# Patient Record
Sex: FEMALE | Race: WHITE | Employment: FULL TIME | URBAN - METROPOLITAN AREA
[De-identification: names, ages, dates, MRNs, and addresses within clinical notes are randomized per-mention and may not be internally consistent; named-entity substitution may affect disease eponyms.]

---

## 2017-10-23 ENCOUNTER — OFFICE VISIT (OUTPATIENT)
Dept: INTERNAL MEDICINE CLINIC | Age: 32
End: 2017-10-23

## 2017-10-23 VITALS
RESPIRATION RATE: 17 BRPM | SYSTOLIC BLOOD PRESSURE: 124 MMHG | HEIGHT: 63 IN | WEIGHT: 149.4 LBS | BODY MASS INDEX: 26.47 KG/M2 | OXYGEN SATURATION: 98 % | TEMPERATURE: 98.9 F | DIASTOLIC BLOOD PRESSURE: 86 MMHG | HEART RATE: 86 BPM

## 2017-10-23 DIAGNOSIS — E78.00 ELEVATED LDL CHOLESTEROL LEVEL: ICD-10-CM

## 2017-10-23 DIAGNOSIS — H66.003 ACUTE SUPPURATIVE OTITIS MEDIA OF BOTH EARS WITHOUT SPONTANEOUS RUPTURE OF TYMPANIC MEMBRANES, RECURRENCE NOT SPECIFIED: Primary | ICD-10-CM

## 2017-10-23 DIAGNOSIS — R03.0 ELEVATED BLOOD PRESSURE READING: ICD-10-CM

## 2017-10-23 RX ORDER — ASCORBIC ACID, BIOTIN, CHOLECALCIFEROL, CYANOCOBALAMIN, FOLIC ACID, FERROUS ASPARTO GLYCINATE, POTASSIUM IODIDE, PYRIDOXINE HYDROCHLORIDE, .ALPHA.-TOCOPHEROL ACETATE, DL-, DOCUSATE SODIUM AND BLUEBERRY 30; 75; 500; 13; 400; 10; 150; 5; 10; 200; 5; 600 MG/1; UG/1; [IU]/1; UG/1; UG/1; MG/1; UG/1; MG/1; [IU]/1; MG/1; MG/1; UG/1
TABLET, FILM COATED ORAL
COMMUNITY
Start: 2017-10-04

## 2017-10-23 RX ORDER — LEVOTHYROXINE SODIUM 50 UG/1
TABLET ORAL
COMMUNITY
Start: 2017-10-12

## 2017-10-23 RX ORDER — AMOXICILLIN 875 MG/1
875 TABLET, FILM COATED ORAL 2 TIMES DAILY
Qty: 20 TAB | Refills: 0 | Status: SHIPPED | OUTPATIENT
Start: 2017-10-23 | End: 2018-07-10 | Stop reason: ALTCHOICE

## 2017-10-23 NOTE — PROGRESS NOTES
Pt here for high blood pressure that has been occurring for a few days along with dizzy spells. She also states on the 14th her smart watch alerted her that while she was sleeping her heart rate had increased to 127   Chief Complaint   Patient presents with    Hypertension     Visit Vitals    /86 (BP 1 Location: Right arm, BP Patient Position: Sitting)    Pulse 86    Temp 98.9 °F (37.2 °C) (Oral)    Resp 17    Ht 5' 3\" (1.6 m)    Wt 149 lb 6.4 oz (67.8 kg)    SpO2 98%    BMI 26.47 kg/m2     1. Have you been to the ER, urgent care clinic since your last visit? Hospitalized since your last visit? No    2. Have you seen or consulted any other health care providers outside of the [address] [place] [address] since your last visit? Include any pap smears or colon screening.  Yes 10/12/17 endocrinologist Rehana Reyes

## 2017-10-23 NOTE — PROGRESS NOTES
Mykel Ricks is a  28 y.o. female presents for visit. Concerned about elevated blood pressure    Chief Complaint   Patient presents with    Hypertension       HPI     Patient reports dx with gestational HTN at the end of her pregnancy in August 2017. Post-partum BP returned to baseline. Recently at home she has had a couple of elevated BP readings,  332'Y systolic and 442 diastolic and 1 episode of tachycardia that was noted by her smart watch. Also reports 2 episodes of light headedness and feeling dizzy which resolved spontaneously after 30 seconds. Reports family history of heart disease. Review of Systems   Constitutional: Negative for chills and fever. Respiratory: Negative for shortness of breath. Cardiovascular: Negative for chest pain. Gastrointestinal: Negative for nausea and vomiting. Genitourinary: Negative for urgency. Neurological: Positive for dizziness. Negative for sensory change. Patient Active Problem List    Diagnosis Date Noted    Endometriosis 12/22/2011    Vitamin D deficiency 02/19/2010    Unspecified asthma(493.90) 05/04/2009    Allergic rhinitis, cause unspecified 05/04/2009    Migraine 05/04/2009     Past Medical History:   Diagnosis Date    Asthma     Ovarian cyst       Past Surgical History:   Procedure Laterality Date    ABDOMEN SURGERY PROC UNLISTED      3 abdominal     HX ORTHOPAEDIC      right knee        Social History   Substance Use Topics    Smoking status: Never Smoker    Smokeless tobacco: Never Used    Alcohol use Yes      Comment: rare      Social History     Social History Narrative     Family History   Problem Relation Age of Onset   N[facility] Stamp Migraines Mother     Diabetes Father     Heart Failure Father     Asthma Father       Prior to Admission medications    Medication Sig Start Date End Date Taking?  Authorizing Provider   UNITHROID 50 mcg tablet  10/12/17  Yes Historical Provider   PRENATE PIXIE 10 mg iron- 1 mg-200 mg cap  10/4/17  Yes Historical Provider   amoxicillin (AMOXIL) 875 mg tablet Take 1 Tab by mouth two (2) times a day. 10/23/17  Yes Amy Hanson NP   [facility]rate [facility] m[facility]/actuation inhal[facility] Take  by inhalation. Yes Historical Provider   fluticasone-salmeterol (ADVAIR) 250-50 mcg/dose diskus inhaler Take 1 Puff by inhalation two (2) times a day. 8/18/14  Yes Susan Larios MD   cetirizine (ZYRTEC) 10 mg tablet take 1 Tab by mouth daily. 6/24/09  Yes Barbara Askew MD   montelukast (SINGULAIR) 10 mg tablet Take 10 mg by mouth daily. Historical Provider   rizatriptan (MAXALT) 10 mg tablet Take 1 tablet by mouth once as needed for Migraine for up to 10 doses. May repeat in 2 hours if needed 11/10/14   Susan Larios MD      Allergies   Allergen Reactions    Darvocet A500 [Propoxyphene N-Acetaminophen] Other (comments)     Causes severe vomiting    Magnesium Oxide Myalgia    Percocet [Oxycodone-Acetaminophen] Other (comments)     Causes severe vomiting          Visit Vitals    /86 (BP 1 Location: Right arm, BP Patient Position: Sitting)    Pulse 86    Temp 98.9 °F (37.2 °C) (Oral)    Resp 17    Ht 5' 3\" (1.6 m)    Wt 149 lb 6.4 oz (67.8 kg)    SpO2 98%    BMI 26.47 kg/m2     Physical Exam   Constitutional: No distress. HENT:   Head: Normocephalic and atraumatic. Right Ear: No tenderness. Tympanic membrane is erythematous. No middle ear effusion. Left Ear: No tenderness. Tympanic membrane is erythematous. No middle ear effusion. Nose: No mucosal edema or rhinorrhea. Right sinus exhibits no maxillary sinus tenderness and no frontal sinus tenderness. Left sinus exhibits no maxillary sinus tenderness and no frontal sinus tenderness. Mouth/Throat: Uvula is midline and mucous membranes are normal. No oropharyngeal exudate or posterior oropharyngeal erythema. Eyes: Conjunctivae are normal.   Cardiovascular: Regular rhythm and normal heart sounds.     No murmur heard.  Pulmonary/Chest: Effort normal and breath sounds normal. She has no wheezes. She has no rales. Lymphadenopathy:     She has no cervical adenopathy. Nursing note and vitals reviewed. This note will not be viewable in [address] ASSESSMENT AND PLAN:      ICD-10-CM ICD-9-CM    1. Acute suppurative otitis media of both ears without spontaneous rupture of tympanic membranes, recurrence not specified H66.003 382.00 amoxicillin (AMOXIL) 875 mg tablet   2. Elevated LDL cholesterol level E78.00 272.0 Encourage eating healthy, active lifestyle,   3. Elevated blood pressure reading R03.0 796.2 Returned to baseline, continue to monitor         Follow-up Disposition:  Return in about 4 weeks (around 11/20/2017), or if symptoms worsen or fail to improve, for FULL Phyisal Exam.        Disclaimer:  Advised her to call back or return to office if symptoms worsen/change/persist.  Discussed expected course/resolution/complications of diagnosis in detail with patient. Medication risks/benefits/alternatives discussed with patient. She was given an after visit summary which includes diagnoses, current medications, & vitals. Discussed patient instructions and advised to read to all patient instructions regarding care. She expressed understanding with the diagnosis and plan.

## 2018-07-10 ENCOUNTER — OFFICE VISIT (OUTPATIENT)
Dept: INTERNAL MEDICINE CLINIC | Age: 33
End: 2018-07-10

## 2018-07-10 VITALS
RESPIRATION RATE: 12 BRPM | WEIGHT: 160 LBS | BODY MASS INDEX: 28.35 KG/M2 | OXYGEN SATURATION: 98 % | HEIGHT: 63 IN | TEMPERATURE: 98.4 F | DIASTOLIC BLOOD PRESSURE: 84 MMHG | SYSTOLIC BLOOD PRESSURE: 112 MMHG | HEART RATE: 90 BPM

## 2018-07-10 DIAGNOSIS — J01.00 SUBACUTE MAXILLARY SINUSITIS: ICD-10-CM

## 2018-07-10 DIAGNOSIS — H66.91 ACUTE INFECTION OF RIGHT EAR: Primary | ICD-10-CM

## 2018-07-10 RX ORDER — LEVOTHYROXINE SODIUM 125 UG/1
TABLET ORAL
COMMUNITY
Start: 2018-05-30

## 2018-07-10 RX ORDER — AMOXICILLIN AND CLAVULANATE POTASSIUM 875; 125 MG/1; MG/1
1 TABLET, FILM COATED ORAL EVERY 12 HOURS
Qty: 14 TAB | Refills: 0 | Status: SHIPPED | OUTPATIENT
Start: 2018-07-10 | End: 2018-07-17

## 2018-07-10 RX ORDER — FLUTICASONE PROPIONATE AND SALMETEROL 50; 500 UG/1; UG/1
POWDER RESPIRATORY (INHALATION)
COMMUNITY
Start: 2018-06-19

## 2018-07-10 RX ORDER — NORETHINDRONE ACETATE AND ETHINYL ESTRADIOL 1.5; 3 MG/1; UG/1
TABLET ORAL
COMMUNITY
Start: 2018-06-25

## 2018-07-10 RX ORDER — FLUTICASONE PROPIONATE 50 MCG
2 SPRAY, SUSPENSION (ML) NASAL DAILY
Qty: 1 BOTTLE | Refills: 0 | Status: SHIPPED | OUTPATIENT
Start: 2018-07-10 | End: 2018-07-15

## 2018-07-10 NOTE — PROGRESS NOTES
Written by Savannah Fernando, as dictated by Dr. Tang Shelton MD.    Cecile Duobse is a 28 y.o. female. HPI  The patient presents today c/o sinus infection and possible ear infection for 2 nights. She reports that her son came home sick from his father's house. Her son has a cold and ear infection. She reports that the temporal and oral thermometer had normal temperature readings. Her tympanic temperature was 102. She has only been taking Mucinex. She has not had any issues taking Augmentin. She has a hx of sinus infections. She is compliant on Unithroid 125 mcg. She just stopped taking prenatal vitamins last week. Her son is 11 months. Patient Active Problem List   Diagnosis Code    Unspecified asthma(493.90) J45.909    Allergic rhinitis, cause unspecified J30.9    Migraine G43.909    Endometriosis N80.9        Current Outpatient Prescriptions on File Prior to Visit   Medication Sig Dispense Refill    levalbuterol tartrate (XOPENEX HFA) 45 mcg/actuation inhaler Take  by inhalation.  cetirizine (ZYRTEC) 10 mg tablet take 1 Tab by mouth daily. 90 Tab 4    UNITHROID 50 mcg tablet       PRENATE PIXIE 10 mg iron- 1 mg-200 mg cap       montelukast (SINGULAIR) 10 mg tablet Take 10 mg by mouth daily.  rizatriptan (MAXALT) 10 mg tablet Take 1 tablet by mouth once as needed for Migraine for up to 10 doses. May repeat in 2 hours if needed 10 tablet 0    fluticasone-salmeterol (ADVAIR) 250-50 mcg/dose diskus inhaler Take 1 Puff by inhalation two (2) times a day. 1 Inhaler 1     No current facility-administered medications on file prior to visit.         Allergies   Allergen Reactions    Darvocet A500 [Propoxyphene N-Acetaminophen] Other (comments)     Causes severe vomiting    Magnesium Oxide Myalgia    Percocet [Oxycodone-Acetaminophen] Other (comments)     Causes severe vomiting       Past Medical History:   Diagnosis Date    Asthma     Ovarian cyst Past Surgical History:   Procedure Laterality Date    ABDOMEN SURGERY PROC UNLISTED      3 abdominal     HX ORTHOPAEDIC      right knee       Family History   Problem Relation Age of Onset   [address] [facility] Migra[facility] Mother     Diabetes Father     Heart Failure Father     Asthma Father        Social History     Social History    Marital status: SINGLE     Spouse name: N/A    Number of children: N/A    Years of education: N/A     Occupational History    Not on file. Social History Main Topics    Smoking status: Never Smoker    Smokeless tobacco: Never Used    Alcohol use Yes      Comment: rare    Drug use: No    Sexual activity: Yes     Partners: Male     Birth control/ protection: Pill     Other Topics Concern    Not on file     Social History Narrative       Review of Systems   Constitutional: Positive for malaise/fatigue. HENT: Positive for congestion, ear pain and sinus pain. Eyes: Negative for blurred vision and pain. Respiratory: Negative for cough and shortness of breath. Cardiovascular: Negative for chest pain and palpitations. Neurological: Negative for weakness. Psychiatric/Behavioral: Negative for depression and memory loss. Visit Vitals    /84 (BP 1 Location: Right arm, BP Patient Position: Sitting)    Pulse 90    Temp 98.4 °F (36.9 °C) (Oral)    Resp 12    Ht 5' 3\" (1.6 m)    Wt 160 lb (72.6 kg)    LMP 06/25/2018    SpO2 98%    BMI 28.34 kg/m2       Physical Exam   Constitutional: She is oriented to person, place, and time. She appears well-developed and well-nourished. HENT:   Right Ear: External ear normal.   Left Ear: External ear normal.   BL ear canal erythema R more than L, BL maxillary tenderness L more than R   Eyes: Conjunctivae and EOM are normal. Right eye exhibits no discharge. Left eye exhibits no discharge. Neck: Normal range of motion. Neck supple. Cardiovascular: Normal rate and regular rhythm.     Pulmonary/Chest: Effort normal and breath sounds normal.   Abdominal: Soft. Bowel sounds are normal.   Lymphadenopathy:     She has no cervical adenopathy. Neurological: She is alert and oriented to person, place, and time. Psychiatric: She has a normal mood and affect. Nursing note and vitals reviewed. ASSESSMENT and PLAN    ICD-10-CM ICD-9-CM    1. Acute infection of right ear H66.91 382.9 amoxicillin-clavulanate (AUGMENTIN) 875-125 mg per tablet sent to pharmacy. 2. Subacute maxillary sinusitis J01.00 461.0 amoxicillin-clavulanate (AUGMENTIN) 875-125 mg per tablet sent to pharmacy. fluticasone (FLONASE) 50 mcg/actuation nasal spray sent to pharmacy. Augmentin 875-125 mg prescribed. She should take 1 tablet BID for 7 days. While taking Augmentin, she should take probiotics to help prevent nausea. Flonase prescribed. This plan was reviewed with the patient and patient agrees. All questions were answered. This scribe documentation was reviewed by me and accurately reflects the examination and decisions made by me. This note will not be viewable in [address]

## 2018-07-10 NOTE — PROGRESS NOTES
1. Have you been to the ER, urgent care clinic since your last visit? Hospitalized since your last visit? No    2. Have you seen or consulted any other health care providers outside of the [address] [place] [address] since your last visit? Include any pap smears or colon screening. No       Patient had pap smear 11/2017-GYN- [address] [facility]      Chief Complaint   Patient presents with    Sinus Infection     Patient states it started 2 days ago, having pressure and drainage. States she is having pain in both ears. Patient states she took her tympanic temperature and it was 102 but when she took it oral it was 98.       Visit Vitals    /84 (BP 1 Location: Right arm, BP Patient Position: Sitting)    Pulse 90    Temp 98.4 °F (36.9 °C) (Oral)    Resp 12    Ht 5' 3\" (1.6 m)    Wt 160 lb (72.6 kg)    SpO2 98%    BMI 28.34 kg/m2       PHQ over the last two weeks 7/10/2018   Little interest or pleasure in doing things Not at all   Feeling down, depressed or hopeless Not at all   Total Score PHQ 2 0     Learning Assessment 7/10/2018   PRIMARY LEARNER Patient   PRIMARY LANGUAGE ENGLISH   LEARNER PREFERENCE PRIMARY DEMONSTRATION   ANSWERED BY patient   RELATIONSHIP SELF

## 2018-07-10 NOTE — MR AVS SNAPSHOT
[address] [facility] [address] [place] [address] [address] 
[phone] Patient: Tushar Nguyen MRN: [id number] Visit Information Date & Time Provider Department Dept. Phone Encounter #  
 7/10/2018  9:30 AM Roger Snell MD [facility] Internal Medicine [phone] [id number] Upcoming Health Maintenance Date Due  
 PAP AKA CERVICAL CYTOLOGY 10/15/2017 Influenza Age 5 to Adult 8/1/2018 DTaP/Tdap/Td series (2 - Td) 9/8/2025 Allergies as of 7/10/2018  Review Complete On: 7/10/2018 By: Roger Snell MD  
  
 Severity Noted Reaction Type Reactions Darvocet A500 [Propoxyphene N-acetaminophen]  11/30/2010    Other (comments) Causes severe vomiting Magnesium Oxide  09/08/2015    Myalgia Percocet [Oxycodone-acetaminophen]  11/30/2010    Other (comments) Causes severe vomiting Current Immunizations  Never Reviewed Name Date Tdap 9/8/2015 Not reviewed this visit You Were Diagnosed With   
  
 Codes Comments Acute infection of right ear    -  Primary ICD-10-CM: H66.91 
ICD-9-CM: 382.9 Subacute maxillary sinusitis     ICD-10-CM: J01.00 ICD-9-CM: 461.0 Vitals BP Pulse Temp Resp Height(growth percentile) Weight(growth percentile) 112/84 (BP 1 Location: Right arm, BP Patient Position: Sitting) 90 98.4 °F (36.9 °C) (Oral) 12 5' 3\" (1.6 m) 160 lb (72.6 kg) LMP SpO2 BMI OB Status Smoking Status 06/25/2018 98% 28.34 kg/m2 Having regular periods Never Smoker Vitals History BMI and BSA Data Body Mass Index Body Surface Area  
 28.34 kg/m 2 1.8 m 2 Preferred Pharmacy Pharmacy Name Phone [facility], [address] [place] [address] [phone] Your Updated Medication List  
  
   
This list is accurate as of 7/10/18 10:18 AM.  Always use your most recent med list.  
  
  
  
  
 amoxicillin-clavulanate 875-125 mg per tablet Commonly known as:  AUGMENTIN Take 1 Tab by mouth every twelve (12) hours for 7 days. cetirizine 10 mg tablet Commonly known as:  ZyrTEC  
take 1 Tab by mouth daily. fluticasone 50 mcg/actuation nasal spray Commonly known as:  Janette Fryer 2 Sprays by Both Nostrils route daily for 5 days. * fluticasone-salmeterol 250-50 mcg/dose diskus inhaler Commonly known as:  ADVAIR Take 1 Puff by inhalation two (2) times a day. * ADVAIR DISKUS 500-50 mcg/dose diskus inhaler Generic drug:  fluticasone-salmeterol Irma Cristian 1.5/30 (21) 1.5-30 mg-mcg Tab Generic drug:  norethindrone ac-eth estradiol  
  
 montelukast 10 mg tablet Commonly known as:  SINGULAIR Take 10 mg by mouth daily. PRENATE PIXIE 10 mg iron- 1 mg-200 mg Cap Generic drug:  prenatal vit 85-iron-FA 1-dha  
  
 rizatriptan 10 mg tablet Commonly known as:  Epimenio Ormond Take 1 tablet by mouth once as needed for Migraine for up to 10 doses. May repeat in 2 hours if needed * UNITHROID 50 mcg tablet Generic drug:  levothyroxine * UNITHROID 125 mcg tablet Generic drug:  levothyroxine XOPENEX HFA 45 mcg/actuation inhaler Generic drug:  levalbuterol tartrate Take  by inhalation. * Notice: This list has 4 medication(s) that are the same as other medications prescribed for you. Read the directions carefully, and ask your doctor or other care provider to review them with you. Prescriptions Sent to Pharmacy Refills  
 amoxicillin-clavulanate (AUGMENTIN) 875-125 mg per tablet 0 Sig: Take 1 Tab by mouth every twelve (12) hours for 7 days. Class: Normal  
 Pharmacy: [facility] [address] Ph #: [phone] Route: Oral  
 fluticasone (FLONASE) 50 mcg/actuation nasal spray 0 Si Sprays by Both Nostrils route daily for 5 days. Class: Normal  
 Pharmacy: [facility] [address] Ph #: [phone] Route: Both Nostrils Introducing [facility]! [employer] introduces [employer] patient portal. Now you can access parts of your medical record, email your doctor's office, and request medication refills online. 1. In your internet browser, go to [email or web address] 2. Click on the First Time User? Click Here link in the Sign In box. You will see the New Member Sign Up page. 3. Enter your [employer] Access Code exactly as it appears below. You will not need to use this code after youve completed the sign-up process. If you do not sign up before the expiration date, you must request a new code. · [employer] Access Code: [email or web address] Expires: 10/8/2018 10:18 AM 
 
4. Enter the last four digits of your Social Security Number (xxxx) and Date of Birth (mm/dd/yyyy) as indicated and click Submit. You will be taken to the next sign-up page. 5. Create a [employer] ID. This will be your [employer] login ID and cannot be changed, so think of one that is secure and easy to remember. 6. Create a [employer] password. You can change your password at any time. 7. Enter your Password Reset Question and Answer. This can be used at a later time if you forget your password. 8. Enter your e-mail address. You will receive e-mail notification when new information is available in [address] 9. Click Sign Up. You can now view and download portions of your medical record. 10. Click the Download Summary menu link to download a portable copy of your medical information. If you have questions, please visit the Frequently Asked Questions section of the [employer] website. Remember, [employer] is NOT to be used for urgent needs. For medical emergencies, dial 911. Now available from your iPhone and Android! Please provide this summary of care documentation to your next provider. Your primary care clinician is listed as Belkis Machado.  If you have any questions after today's visit, please call [phone].

## 2019-03-28 ENCOUNTER — OFFICE VISIT (OUTPATIENT)
Dept: PRIMARY CARE CLINIC | Age: 34
End: 2019-03-28

## 2019-03-28 VITALS
SYSTOLIC BLOOD PRESSURE: 121 MMHG | TEMPERATURE: 98.5 F | HEIGHT: 63 IN | OXYGEN SATURATION: 96 % | HEART RATE: 85 BPM | BODY MASS INDEX: 28.88 KG/M2 | WEIGHT: 163 LBS | DIASTOLIC BLOOD PRESSURE: 81 MMHG | RESPIRATION RATE: 16 BRPM

## 2019-03-28 DIAGNOSIS — B35.1 ONYCHOMYCOSIS OF TOENAIL: ICD-10-CM

## 2019-03-28 DIAGNOSIS — J02.9 SORE THROAT: Primary | ICD-10-CM

## 2019-03-28 DIAGNOSIS — K20.90 ESOPHAGITIS: ICD-10-CM

## 2019-03-28 LAB
S PYO AG THROAT QL: NEGATIVE
VALID INTERNAL CONTROL?: YES

## 2019-03-28 RX ORDER — CICLOPIROX 80 MG/ML
SOLUTION TOPICAL
Qty: 6.6 ML | Refills: 1 | Status: SHIPPED | OUTPATIENT
Start: 2019-03-28

## 2019-03-28 RX ORDER — SUCRALFATE 1 G/10ML
1 SUSPENSION ORAL 4 TIMES DAILY
Qty: 414 ML | Refills: 0 | Status: SHIPPED | OUTPATIENT
Start: 2019-03-28

## 2019-03-28 NOTE — PROGRESS NOTES
Chief Complaint   Patient presents with    Other     basically patient states that she had sinus infection and had to cut her medication in half and felt like it got stuck in the throat so one little section of the throat feels like it was cut or something by the pill.   also feels like she has a toe nail infection from a broken toenail.  great toe on the left foot

## 2021-01-26 ENCOUNTER — OFFICE VISIT (OUTPATIENT)
Dept: PRIMARY CARE CLINIC | Age: 36
End: 2021-01-26
Payer: COMMERCIAL

## 2021-01-26 VITALS
RESPIRATION RATE: 18 BRPM | DIASTOLIC BLOOD PRESSURE: 82 MMHG | TEMPERATURE: 98.6 F | HEART RATE: 84 BPM | OXYGEN SATURATION: 100 % | BODY MASS INDEX: 26.15 KG/M2 | HEIGHT: 63 IN | WEIGHT: 147.6 LBS | SYSTOLIC BLOOD PRESSURE: 115 MMHG

## 2021-01-26 DIAGNOSIS — B35.1 ONYCHOMYCOSIS OF TOENAIL: ICD-10-CM

## 2021-01-26 DIAGNOSIS — G43.111 INTRACTABLE MIGRAINE WITH AURA WITH STATUS MIGRAINOSUS: ICD-10-CM

## 2021-01-26 DIAGNOSIS — J45.40 MODERATE PERSISTENT ASTHMA WITHOUT COMPLICATION: ICD-10-CM

## 2021-01-26 DIAGNOSIS — E55.9 VITAMIN D DEFICIENCY: ICD-10-CM

## 2021-01-26 DIAGNOSIS — Z00.00 PHYSICAL EXAM: Primary | ICD-10-CM

## 2021-01-26 PROCEDURE — 99395 PREV VISIT EST AGE 18-39: CPT | Performed by: INTERNAL MEDICINE

## 2021-01-26 RX ORDER — TERBINAFINE HYDROCHLORIDE 250 MG/1
250 TABLET ORAL DAILY
Qty: 90 TAB | Refills: 0 | Status: SHIPPED | OUTPATIENT
Start: 2021-01-26 | End: 2021-04-26

## 2021-01-26 NOTE — PROGRESS NOTES
Written by Hema Han, as dictated by Dr. Patrick Siddiqui MD.    Amelia Cuba (: 1985) is a 28 y.o. female, established patient, here for evaluation of the following chief complaint(s):  Physical (labs) and Nail Problem (non healing)     SUBJECTIVE/OBJECTIVE:  HPI  The patient comes in fasting today for a complete physical examination and labs. She has not been taking vitamin D recently but plans to start. She had her son 3 and a half years ago and has not been having migraine issues until the past few months. Recently stress has been triggering more frequent migraines. Usually she sleeps to get rid of them, but if she has to stay at work she takes tylenol. She broke her toenail when she pulled a door back too hard, and it has not be healing correctly. She came here on 19 and NP Jackeline Lozano prescribed ciclopirox 8% solution first given the risk of PO antifungal medications. She has tried applying this every day and is still having issues with the toenail. She is having trouble from time to time with her insurance covering her Advair inhaler and has had to wait for a few weeks to get the inhaler. However, she has it now and also continues on Singulair. She got her Pap smear done in 2020 and her endometriosis is stable. She endorses dysmenorrhea, but this is normal for her. Patient Active Problem List   Diagnosis Code    Unspecified asthma(493.90) J45.909    Allergic rhinitis, cause unspecified J30.9    Migraine G43.909    Endometriosis N80.9        Current Outpatient Medications on File Prior to Visit   Medication Sig Dispense Refill    sucralfate (CARAFATE) 100 mg/mL suspension Take 5 mL by mouth four (4) times daily. 414 mL 0    ADVAIR DISKUS 500-50 mcg/dose diskus inhaler       UNITHROID 125 mcg tablet       PRENATE PIXIE 10 mg iron- 1 mg-200 mg cap       montelukast (SINGULAIR) 10 mg tablet Take 10 mg by mouth daily.       levalbuterol tartrate [facility] HFA) 45 mcg/actuation inhaler Take  by inhalation.  rizatriptan (MAXALT) 10 mg tablet Take 1 tablet by mouth once as needed for Migraine for up to 10 doses. May repeat in 2 hours if needed 10 tablet 0    fluticasone-salmeterol (ADVAIR) 250-50 mcg/dose diskus inhaler Take 1 Puff by inhalation two (2) times a day. 1 Inhaler 1    cetirizine (ZYRTEC) 10 mg tablet take 1 Tab by mouth daily. 90 Tab 4    ciclopirox (PENLAC) 8 % solution Apply thin layer to toe nightly. 6.6 mL 1    JUNEL 1.5/30, 21, 1.5-30 mg-mcg tab       UNITHROID 50 mcg tablet        No current facility-administered medications on file prior to visit.         Allergies   Allergen Reactions    Darvocet A500 [Propoxyphene N-Acetaminophen] Other (comments)     Causes severe vomiting    Hydrocodone Nausea and Vomiting     Severe vomiting      Magnesium Oxide Myalgia    Percocet [Oxycodone-Acetaminophen] Other (comments)     Causes severe vomiting       Past Medical History:   Diagnosis Date    Asthma     Ovarian cyst        Past Surgical History:   Procedure Laterality Date    HX ORTHOPAEDIC      right knee    NV ABDOMEN SURGERY PROC UNLISTED      3 abdominal        Family History   Problem Relation Age of Onset    Migraines Mother     Diabetes Father     Heart Failure Father     Asthma Father        Social History     Socioeconomic History    Marital status: SINGLE     Spouse name: Not on file    Number of children: Not on file    Years of education: Not on file    Highest education level: Not on file   Occupational History    Not on file   Social Needs    Financial resource strain: Not on file    Food insecurity     Worry: Not on file     Inability: Not on file    Transportation needs     Medical: Not on file     Non-medical: Not on file   Tobacco Use    Smoking status: Never Smoker    Smokeless tobacco: Never Used   Substance and Sexual Activity    Alcohol use: Yes     Comment: rare    Drug use: No    Sexual activity: Yes Partners: Male     Birth control/protection: Pill   Lifestyle    Physical activity     Days per week: Not on file     Minutes per session: Not on file    Stress: Not on file   Relationships    Social connections     Talks on phone: Not on file     Gets together: Not on file     Attends [employer] service: Not on file     Active member of club or organization: Not on file     Attends meetings of clubs or organizations: Not on file     Relationship status: Not on file    Intimate partner violence     Fear of current or ex partner: Not on file     Emotionally abused: Not on file     Physically abused: Not on file     Forced sexual activity: Not on file   Other Topics Concern    Not on file   Social History Narrative    Not on file       No visits with results within 3 Month(s) from this visit. Latest known visit with results is:   Office Visit on 03/28/2019   Component Date Value Ref Range Status    VALID INTERNAL CONTROL POC 03/28/2019 Yes   Final    Group A Strep Ag 03/28/2019 Negative  Negative Final     Review of Systems   Constitutional: Negative for activity change, fatigue and unexpected weight change. HENT: Negative for congestion, hearing loss, rhinorrhea and sore throat. Eyes: Negative for discharge. Respiratory: Negative for cough, chest tightness and shortness of breath. Cardiovascular: Negative for leg swelling. Gastrointestinal: Negative for abdominal pain, constipation and diarrhea. Genitourinary: Negative for dysuria, flank pain, frequency and urgency. Musculoskeletal: Negative for arthralgias, back pain and myalgias. Skin: Negative for color change and rash. +broken toenail   Neurological: Negative for dizziness, light-headedness and headaches. Psychiatric/Behavioral: Negative for dysphoric mood and sleep disturbance. The patient is not nervous/anxious.       Visit Vitals  /82 (BP 1 Location: Right arm, BP Patient Position: Sitting)   Pulse 84   Temp 98.6 °F (37 °C) (Temporal)   Resp 18   Ht 5' 3\" (1.6 m)   Wt 147 lb 9.6 oz (67 kg)   LMP 01/24/2021   SpO2 100%   BMI 26.15 kg/m²     Physical Exam  Vitals signs and nursing note reviewed. Constitutional:       General: She is not in acute distress. Appearance: Normal appearance. She is normal weight. She is not diaphoretic. HENT:      Right Ear: Tympanic membrane, ear canal and external ear normal.      Left Ear: Tympanic membrane, ear canal and external ear normal.      Mouth/Throat:      Mouth: Mucous membranes are moist.      Pharynx: Oropharynx is clear. Eyes:      General:         Right eye: No discharge. Left eye: No discharge. Extraocular Movements: Extraocular movements intact. Conjunctiva/sclera: Conjunctivae normal.   Neck:      Thyroid: No thyromegaly. Cardiovascular:      Rate and Rhythm: Normal rate and regular rhythm. Pulses: Normal pulses. Dorsalis pedis pulses are 2+ on the right side and 2+ on the left side. Pulmonary:      Effort: Pulmonary effort is normal.      Breath sounds: Normal breath sounds. No wheezing. Abdominal:      General: Bowel sounds are normal. There is no distension. Palpations: Abdomen is soft. Tenderness: There is no abdominal tenderness. Musculoskeletal:      Right lower leg: No edema. Left lower leg: No edema. Comments: B/L knees without crepitus   Feet:      Left foot:      Toenail Condition: Left toenails are abnormally thick. Fungal disease present. Lymphadenopathy:      Cervical: No cervical adenopathy. Neurological:      Deep Tendon Reflexes:      Reflex Scores:       Patellar reflexes are 2+ on the right side and 2+ on the left side. Psychiatric:         Mood and Affect: Mood and affect normal.         ASSESSMENT/PLAN:  1. Physical exam  -     METABOLIC PANEL, COMPREHENSIVE; Future  -     CBC W/O DIFF; Future  -     LIPID PANEL; Future  -     TSH 3RD GENERATION; Future  Complete physical exam done. Basic labs drawn. 2. Intractable migraine with aura with status migrainosus  Takes tylenol as needed for pain. If continues will discuss Imitrex. 3. Onychomycosis of toenail  -     terbinafine HCL (LAMISIL) 250 mg tablet; Take 1 Tab by mouth daily for 90 days. , Normal, Disp-90 Tab, R-0 sent to pharmacy. Potential side effects were discussed. I prescribed Lamisil but explained that she will need to have labs done to make sure her liver is healthy to metabolize this medication. After a month we will need to re-check her CMP to make sure her liver remains healthy. The medication will help with her discoloration, but she may need to see a podiatrist to completely fix the toenail thickness. 4. Vitamin D deficiency  -     VITAMIN D, 25 HYDROXY; Future  Check vitamin D. She has not been taking a vitamin D supplement. 5. Moderate persistent asthma without complication  Stable, continues on Advair BID and Singulair every day. This plan was reviewed with the patient and patient agrees. All questions were answered. This scribe documentation was reviewed by me and accurately reflects the examination and decisions made by me. An electronic signature was used to authenticate this note.   -- Laina Jones

## 2021-04-09 ENCOUNTER — TELEPHONE (OUTPATIENT)
Dept: PRIMARY CARE CLINIC | Age: 36
End: 2021-04-09

## 2021-04-09 DIAGNOSIS — R94.4 KIDNEY FUNCTION STUDY ABNORMALITY: Primary | ICD-10-CM

## 2021-04-09 NOTE — TELEPHONE ENCOUNTER
Spoke with patient, she stated that Dr Ulises Schroeder wanted to check her Kidney and liver function after start of new medication

## 2021-04-09 NOTE — TELEPHONE ENCOUNTER
Patient called stating that she is needing lab work done in order to take a prescription that Dr. Kentrell Varela called in for her. Patient is requesting the order to be put in.

## 2021-07-28 ENCOUNTER — VIRTUAL VISIT (OUTPATIENT)
Dept: PRIMARY CARE CLINIC | Age: 36
End: 2021-07-28
Payer: COMMERCIAL

## 2021-07-28 DIAGNOSIS — J01.00 SUBACUTE MAXILLARY SINUSITIS: ICD-10-CM

## 2021-07-28 DIAGNOSIS — J45.41 MODERATE PERSISTENT ASTHMA WITH EXACERBATION: Primary | ICD-10-CM

## 2021-07-28 DIAGNOSIS — R05.9 COUGH: ICD-10-CM

## 2021-07-28 PROCEDURE — 99214 OFFICE O/P EST MOD 30 MIN: CPT | Performed by: INTERNAL MEDICINE

## 2021-07-28 RX ORDER — PREDNISONE 20 MG/1
TABLET ORAL
Qty: 12 TABLET | Refills: 0 | Status: SHIPPED | OUTPATIENT
Start: 2021-07-28 | End: 2021-12-28 | Stop reason: ALTCHOICE

## 2021-07-28 RX ORDER — AZELASTINE 1 MG/ML
1 SPRAY, METERED NASAL 2 TIMES DAILY
Qty: 1 BOTTLE | Refills: 0 | Status: SHIPPED | OUTPATIENT
Start: 2021-07-28 | End: 2021-08-23

## 2021-07-28 RX ORDER — BENZONATATE 200 MG/1
200 CAPSULE ORAL
Qty: 21 CAPSULE | Refills: 0 | Status: SHIPPED | OUTPATIENT
Start: 2021-07-28 | End: 2021-08-04

## 2021-07-28 NOTE — PROGRESS NOTES
Melina Joyner (: 1985) is a 28 y.o. female, established patient, here for evaluation of the following chief complaint(s):   Asthma     Written by Dashawn Salgado, as dictated by Dr. Evaristo Hanna MD.      ASSESSMENT/PLAN:  Below is the assessment and plan developed based on review of pertinent labs, studies, and medications. 1. Moderate persistent asthma with exacerbation  Prescribed Deltasone 20 mg. Potential side effects were discussed. Take 3 tabs on Day 1 and Day 2. Take 2 tabs on Day 3 and Day 4. Take 1 tab on Day 5 and 6. Take 1/2 tab on Day 7. After Day 7 d/c Deltasone. Wait until sxs improve to receive COVID-19 vaccine. -     predniSONE (DELTASONE) 20 mg tablet; Prednisone 60 mg po x 2 days,40 mg po x 2 days,20 mg po x 1 day,10 mg po x 1 day then stop., Normal, Disp-12 Tablet, R-0 sent to pharmacy. 2. Subacute maxillary sinusitis  Prescribed Astelin 137 mcg, use 1 spray in both nostrils BID for 10 days as prescribed. Potential side effects were discussed. Wait until sxs improve to receive COVID-19 vaccine. -     azelastine (ASTELIN) 137 mcg (0.1 %) nasal spray; 1 [place] by Both Nostrils route two (2) times a day for 10 days. Use in each nostril as directed, Normal, Disp-1 Bottle, R-0 sent to pharmacy. 3. Cough  Prescribed Tessalon 200 mg, take 1 tab TID for up to 7 days as prescribed. Potential side effects were discussed. Wait until sxs improve to receive COVID-19 vaccine. -     benzonatate (TESSALON) 200 mg capsule; Take 1 Capsule by mouth three (3) times daily as needed for Cough for up to 7 days. , Normal, Disp-21 Capsule, R-0 sent to pharmacy. SUBJECTIVE/OBJECTIVE:  HPI    Patient presents today c/o sinus infection, cough, and an asthma flare up. She has phlegm that comes with her cough. Her sxs started from allergies and progressed. She went to [facility] and they prescribed her a Medrol Dosepack. She notes minimal relief since starting the Medrol Dosepack. She has not received her COVID-19 vaccine due to concerns about her asthma. Patient Active Problem List   Diagnosis Code    Unspecified asthma(493.90) J45.909    Allergic rhinitis, cause unspecified J30.9    Migraine G43.909    Endometriosis N80.9        Current Outpatient Medications on File Prior to Visit   Medication Sig Dispense Refill    sucralfate (CARAFATE) 100 mg/mL suspension Take 5 mL by mouth four (4) times daily. 414 mL 0    ciclopirox (PENLAC) 8 % solution Apply thin layer to toe nightly. 6.6 mL 1    ADVAIR DISKUS 500-50 mcg/dose diskus inhaler       UNITHROID 125 mcg tablet       JUNEL 1.5/30, 21, 1.5-30 mg-mcg tab       UNITHROID 50 mcg tablet       PRENATE PIXIE 10 mg iron- 1 mg-200 mg cap       montelukast (SINGULAIR) 10 mg tablet Take 10 mg by mouth daily.  levalbuterol tartrate (XOPENEX HFA) 45 mcg/actuation inhaler Take  by inhalation.  rizatriptan (MAXALT) 10 mg tablet Take 1 tablet by mouth once as needed for Migraine for up to 10 doses. May repeat in 2 hours if needed 10 tablet 0    fluticasone-salmeterol (ADVAIR) 250-50 mcg/dose diskus inhaler Take 1 Puff by inhalation two (2) times a day. 1 Inhaler 1    cetirizine (ZYRTEC) 10 mg tablet take 1 Tab by mouth daily. 90 Tab 4     No current facility-administered medications on file prior to visit.        Allergies   Allergen Reactions    Darvocet A500 [Propoxyphene N-Acetaminophen] Other (comments)     Causes severe vomiting    Hydrocodone Nausea and Vomiting     Severe vomiting      Magnesium Oxide Myalgia    Percocet [Oxycodone-Acetaminophen] Other (comments)     Causes severe vomiting       Past Medical History:   Diagnosis Date    Asthma     Ovarian cyst        Past Surgical History:   Procedure Laterality Date    HX ORTHOPAEDIC      right knee    VT ABDOMEN SURGERY PROC UNLISTED      3 abdominal        Family History   Problem Relation Age of Onset   [place] Migraines Mother     Diabetes Father     Heart Failure Father     Asthma Father        Social History     Socioeconomic History    Marital status: SINGLE     Spouse name: Not on file    Number of children: Not on file    Years of education: Not on file    Highest education level: Not on file   Occupational History    Not on file   Tobacco Use    Smoking status: Never Smoker    Smokeless tobacco: Never Used   Substance and Sexual Activity    Alcohol use: Yes     Comment: rare    Drug use: No    Sexual activity: Yes     Partners: Male     Birth control/protection: Pill   Other Topics Concern    Not on file   Social History Narrative    Not on file     Social Determinants of Health       No visits with results within 3 Month(s) from this visit. Latest known visit with results is:   Orders Only on 04/20/2021   Component Date Value Ref Range Status    Sodium 04/20/2021 139  136 - 145 mmol/L Final    Potassium 04/20/2021 4.0  3.5 - 5.1 mmol/L Final    Chloride 04/20/2021 102  97 - 108 mmol/L Final    CO2 04/20/2021 32  21 - 32 mmol/L Final    Anion gap 04/20/2021 5  5 - 15 mmol/L Final    Glucose 04/20/2021 78  65 - 100 mg/dL Final    BUN 04/20/2021 17  6 - 20 MG/DL Final    Creatinine 04/20/2021 0.73  0.55 - 1.02 MG/DL Final    BUN/Creatinine ratio 04/20/2021 23* 12 - 20   Final    GFR est AA 04/20/2021 >60  >60 ml/min/1.73m2 Final    GFR est non-AA 04/20/2021 >60  >60 ml/min/1.73m2 Final    Comment: Estimated GFR is calculated using the IDMS-traceable Modification of Diet in  Renal Disease (MDRD) Study equation, reported for both  Americans  (GFRAA) and non- Americans (GFRNA), and normalized to 1.73m2 body  surface area. The physician must decide which value applies to the patient.  Calcium 04/20/2021 9.1  8.5 - 10.1 MG/DL Final    Bilirubin, total 04/20/2021 0.3  0.2 - 1.0 MG/DL Final    ALT (SGPT) 04/20/2021 25  12 - 78 U/L Final    AST (SGOT) 04/20/2021 10* 15 - 37 U/L Final    Alk.  phosphatase 04/20/2021 112  45 - 117 U/L Final    Protein, total 04/20/2021 6.7  6.4 - 8.2 g/dL Final    Albumin 04/20/2021 3.8  3.5 - 5.0 g/dL Final    Globulin 04/20/2021 2.9  2.0 - 4.0 g/dL Final    A-G Ratio 04/20/2021 1.3  1.1 - 2.2   Final       Review of Systems   Constitutional: Negative for activity change, fatigue and unexpected weight change. HENT: Positive for postnasal drip. Negative for congestion, hearing loss, rhinorrhea and sore throat. Eyes: Negative for discharge. Respiratory: Positive for cough. Negative for chest tightness and shortness of breath. Cardiovascular: Negative for leg swelling. Gastrointestinal: Negative for abdominal pain, constipation and diarrhea. Genitourinary: Negative for dysuria, flank pain, frequency and urgency. Musculoskeletal: Negative for arthralgias, back pain and myalgias. Skin: Negative for color change and rash. Allergic/Immunologic: Positive for environmental allergies. Neurological: Negative for dizziness, light-headedness and headaches. Psychiatric/Behavioral: Negative for dysphoric mood and sleep disturbance. The patient is not nervous/anxious. There were no vitals taken for this visit. No flowsheet data found.     Physical Exam    [INSTRUCTIONS:  \"[x]\" Indicates a positive item  \"[]\" Indicates a negative item  -- DELETE ALL ITEMS NOT EXAMINED]    Constitutional: [x] Appears well-developed and well-nourished [x] No apparent distress      [] Abnormal -     Mental status: [x] Alert and awake  [x] Oriented to person/place/time [x] Able to follow commands    [] Abnormal -     Eyes:   EOM    [x]  Normal    [] Abnormal -   Sclera  [x]  Normal    [] Abnormal -          Discharge [x]  None visible   [] Abnormal -     HENT: [x] Normocephalic, atraumatic  [] Abnormal -   [x] Mouth/Throat: Mucous membranes are moist    External Ears [x] Normal  [] Abnormal -    Neck: [x] No visualized mass [] Abnormal -     Pulmonary/Chest: [x] Respiratory effort normal   [x] No visualized signs of difficulty breathing or respiratory distress        [] Abnormal -      Musculoskeletal:   [x] Normal gait with no signs of ataxia         [x] Normal range of motion of neck        [] Abnormal -     Neurological:        [x] No Facial Asymmetry (Cranial nerve 7 motor function) (limited exam due to video visit)          [x] No gaze palsy        [] Abnormal -          Skin:        [x] No significant exanthematous lesions or discoloration noted on facial skin         [] Abnormal -            Psychiatric:       [x] Normal Affect [] Abnormal -        [x] No Hallucinations    Other pertinent observable physical exam findings:-        Caitlyn Garcia, was evaluated through a synchronous (real-time) audio-video encounter. The patient (or guardian if applicable) is aware that this is a billable service. Verbal consent to proceed has been obtained within the past 12 months. The visit was conducted pursuant to the emergency declaration under the [address] [place] [address], [address] [place] [address] authority and the [employer] and [employer] Act. Patient identification was verified, and a caregiver was present when appropriate. The patient was located in a state where the provider was credentialed to provide care. An electronic signature was used to authenticate this note.   -- Rick Flroez

## 2021-08-23 DIAGNOSIS — J01.00 SUBACUTE MAXILLARY SINUSITIS: ICD-10-CM

## 2021-08-23 RX ORDER — AZELASTINE 1 MG/ML
SPRAY, METERED NASAL
Qty: 1 BOTTLE | Refills: 0 | Status: SHIPPED | OUTPATIENT
Start: 2021-08-23

## 2021-12-20 ENCOUNTER — TELEPHONE (OUTPATIENT)
Dept: PRIMARY CARE CLINIC | Age: 36
End: 2021-12-20

## 2021-12-20 NOTE — TELEPHONE ENCOUNTER
----- Message from Yi Travis sent at 12/20/2021  9:02 AM EST -----  Subject: Appointment Request    Reason for Call: Urgent (Patient Request) No Script    QUESTIONS  Type of Appointment? Established Patient  Reason for appointment request? Available appointments did not meet   patient need  Additional Information for Provider? pt has been getting new onset of   migraines again, has had one at least 5 days straight that has made her   vomiting' Next appt avail 1/4/22, Pt is requesting to be seen ASAP, said   she can see any provider. She also wants to know if something for her   migraines can be called in until an appt is avail. Please contact pt ASAP   ---------------------------------------------------------------------------  --------------  CALL BACK INFO  What is the best way for the office to contact you? OK to leave message on   voicemail  Preferred Call Back Phone Number? [phone]  ---------------------------------------------------------------------------  --------------  SCRIPT ANSWERS  Relationship to Patient? Self  Would you describe this as the worst headache of your life? No  Are you having fevers (100.4), chills or sweats? No  Are you having weakness on one side of your body, drooping on one side of   your face or difficult speaking? No  Have you had any injuries to your head? No  Have you recently (14 days) seen a provider for this issue? No  Have you been diagnosed with, awaiting test results for, or told that you   are suspected of having COVID-19 (Coronavirus)? (If patient has tested   negative or was tested as a requirement for work, school, or travel and   not based on symptoms, answer no)? No  Within the past two weeks have you developed any of the following symptoms   (answer no if symptoms have been present longer than 2 weeks or began   more than 2 weeks ago)?  Fever or Chills, Cough, Shortness of breath or   difficulty breathing, Loss of taste or smell, Sore throat, Nasal congestion, Sneezing or runny nose, Fatigue or generalized body aches   (answer no if pain is specific to a body part e.g. back pain), Diarrhea,   Headache? No  Have you had close contact with someone with COVID-19 in the last 14 days? No  (Service Expert  click yes below to proceed with [employer] As Usual   Scheduling)? Yes  (Is the patient requesting to see the provider for a procedure?)? No  (Is the patient requesting to see the provider urgently  today or   tomorrow. )? Yes  Have you been diagnosed with, awaiting test results for, or told that you   are suspected of having COVID-19 (Coronavirus)? (If patient has tested   negative or was tested as a requirement for work, school, or travel and   not based on symptoms, answer no)? No  Within the past two weeks have you developed any of the following symptoms   (answer no if symptoms have been present longer than 2 weeks or began   more than 2 weeks ago)? Fever or Chills, Cough, Shortness of breath or   difficulty breathing, Loss of taste or smell, Sore throat, Nasal   congestion, Sneezing or runny nose, Fatigue or generalized body aches   (answer no if pain is specific to a body part e.g. back pain), Diarrhea,   Headache? No  Have you had close contact with someone with COVID-19 in the last 14 days? No  (Service Expert  click yes below to proceed with [employer] As Usual   Scheduling)?  Yes

## 2021-12-20 NOTE — TELEPHONE ENCOUNTER
Spoke with patient on the phone, patient has started to have migraine again, most recent on has lasted 5 days, vomiting included, as well as nausea. A VV for 12/28 was made at 1:00 told the patient if s/s continue to go to ED or Urgent Clinic to see if they could give a prescription for the time being.  Patient understood and will continue to monitor s/s

## 2021-12-28 ENCOUNTER — VIRTUAL VISIT (OUTPATIENT)
Dept: PRIMARY CARE CLINIC | Age: 36
End: 2021-12-28
Payer: COMMERCIAL

## 2021-12-28 DIAGNOSIS — R11.0 NAUSEA: ICD-10-CM

## 2021-12-28 DIAGNOSIS — G43.111 INTRACTABLE MIGRAINE WITH AURA WITH STATUS MIGRAINOSUS: Primary | ICD-10-CM

## 2021-12-28 PROCEDURE — 99213 OFFICE O/P EST LOW 20 MIN: CPT | Performed by: INTERNAL MEDICINE

## 2021-12-28 RX ORDER — ONDANSETRON 8 MG/1
8 TABLET, ORALLY DISINTEGRATING ORAL
Qty: 10 TABLET | Refills: 0 | Status: SHIPPED | OUTPATIENT
Start: 2021-12-28

## 2021-12-28 RX ORDER — RIZATRIPTAN BENZOATE 10 MG/1
10 TABLET ORAL
Qty: 10 TABLET | Refills: 0 | Status: SHIPPED | OUTPATIENT
Start: 2021-12-28 | End: 2021-12-28

## 2021-12-28 NOTE — PROGRESS NOTES
Hong Medina (: 1985) is a 39 y.o. female, established patient, here for evaluation of the following chief complaint(s):   Headache       ASSESSMENT/PLAN:  Below is the assessment and plan developed based on review of pertinent history, labs, studies, and medications. 1. Intractable migraine with aura with status migrainosus  -     rizatriptan (MAXALT) 10 mg tablet; Take 1 Tablet by mouth once as needed for Migraine for up to 1 dose. May repeat in 2 hours if needed, Normal, Disp-10 Tablet, R-0. If no improvement let me know. 2. Nausea  -     ondansetron (ZOFRAN ODT) 8 mg disintegrating tablet; Take 1 Tablet by mouth every twelve (12) hours as needed for Nausea or Vomiting for up to 10 doses. , Normal, Disp-10 Tablet, R-0        SUBJECTIVE/OBJECTIVE:  HPI   Patient seen today for severe migraines. She used to get migraines before her first pregnancy and was taking Maxalt as needed. Since her pregnancy her migraines been resolved and she is not taking any medication. Last week she had a severe migraine which lasted for few days she took over-the-counter medications. Excedrin helps her for few days but  headaches keep coming back. She does get off-and-on nausea with the headaches. She sees an endocrinologist and last TSH was well within the normal range. Patient Active Problem List   Diagnosis Code    Unspecified asthma(493.90) J45.909    Allergic rhinitis, cause unspecified J30.9    Migraine G43.909    Endometriosis N80.9        Current Outpatient Medications on File Prior to Visit   Medication Sig Dispense Refill    azelastine (ASTELIN) 137 mcg (0.1 %) nasal spray SPRAY ONE SPRAY IN EACH NOSTRIL TWICE DAILY FOR 10 DAYS. USE IN EACH NOSTRIL AS DIRECTED 1 Bottle 0    [DISCONTINUED] predniSONE (DELTASONE) 20 mg tablet Prednisone 60 mg po x 2 days,40 mg po x 2 days,20 mg po x 1 day,10 mg po x 1 day then stop.  12 Tablet 0    sucralfate (CARAFATE) 100 mg/mL suspension Take 5 mL by mouth four (4) times daily. 414 mL 0    ciclopirox (PENLAC) 8 % solution Apply thin layer to toe nightly. 6.6 mL 1    ADVAIR DISKUS 500-50 mcg/dose diskus inhaler       UNITHROID 125 mcg tablet       JUNEL 1.5/30, 21, 1.5-30 mg-mcg tab       UNITHROID 50 mcg tablet       PRENATE PIXIE 10 mg iron- 1 mg-200 mg cap       montelukast (SINGULAIR) 10 mg tablet Take 10 mg by mouth daily.  levalbuterol tartrate (XOPENEX HFA) 45 mcg/actuation inhaler Take  by inhalation.  [DISCONTINUED] rizatriptan (MAXALT) 10 mg tablet Take 1 tablet by mouth once as needed for Migraine for up to 10 doses. May repeat in 2 hours if needed 10 tablet 0    fluticasone-salmeterol (ADVAIR) 250-50 mcg/dose diskus inhaler Take 1 Puff by inhalation two (2) times a day. 1 Inhaler 1    cetirizine (ZYRTEC) 10 mg tablet take 1 Tab by mouth daily. 90 Tab 4     No current facility-administered medications on file prior to visit.        Allergies   Allergen Reactions    Darvocet A500 [Propoxyphene N-Acetaminophen] Other (comments)     Causes severe vomiting    Hydrocodone Nausea and Vomiting     Severe vomiting      Magnesium Oxide Myalgia    Percocet [Oxycodone-Acetaminophen] Other (comments)     Causes severe vomiting       Past Medical History:   Diagnosis Date    Asthma     Ovarian cyst        Past Surgical History:   Procedure Laterality Date    HX ORTHOPAEDIC      right knee    [place] ABDOMEN SURGERY PROC UNLISTED      3 abdominal        Family History   Problem Relation Age of Onset    Migraines Mother     Diabetes Father     Heart Failure Father     Asthma Father        Social History     Socioeconomic History    Marital status: SINGLE     Spouse name: Not on file    Number of children: Not on file    Years of education: Not on file    Highest education level: Not on file   Occupational History    Not on file   Tobacco Use    Smoking status: Never Smoker    Smokeless tobacco: Never Used   Substance and Sexual Activity    Alcohol use: Yes     Comment: rare    Drug use: No    Sexual activity: Yes     Partners: Male     Birth control/protection: Pill   Other Topics Concern    Not on file   Social History Narrative    Not on file     Social Determinants of Health     Financial Resource Strain:     Difficulty of Paying Living Expenses: Not on file   Food Insecurity:     Worried About Running Out of Food in the Last Year: Not on file    Karolyn of Food in the Last Year: Not on file   Transportation Needs:     Lack of Transportation (Medical): Not on file    Lack of Transportation (Non-Medical): Not on file   Physical Activity:     Days of Exercise per Week: Not on file    Minutes of Exercise per Session: Not on file   Stress:     Feeling of Stress : Not on file   Social Connections:     Frequency of Communication with Friends and Family: Not on file    Frequency of Social Gatherings with Friends and Family: Not on file    Attends Evangelical Services: Not on file    Active Member of [address] [place] [address] or Organizations: Not on file    Attends Club or Organization Meetings: Not on file    Marital Status: Not on file   Intimate Partner Violence:     Fear of Current or Ex-Partner: Not on file    Emotionally Abused: Not on file    Physically Abused: Not on file    Sexually Abused: Not on file   Housing Stability:     Unable to Pay for Housing in the Last Year: Not on file    Number of [place] in the Last Year: Not on file    Unstable Housing in the Last Year: Not on file       No visits with results within 3 Month(s) from this visit.    Latest known visit with results is:   Orders Only on 04/20/2021   Component Date Value Ref Range Status    Sodium 04/20/2021 139  136 - 145 mmol/L Final    Potassium 04/20/2021 4.0  3.5 - 5.1 mmol/L Final    Chloride 04/20/2021 102  97 - 108 mmol/L Final    CO2 04/20/2021 32  21 - 32 mmol/L Final    Anion gap 04/20/2021 5  5 - 15 mmol/L Final    Glucose 04/20/2021 78  65 - 100 mg/dL Final  BUN 04/20/2021 17  6 - 20 MG/DL Final    Creatinine 04/20/2021 0.73  0.55 - 1.02 MG/DL Final    BUN/Creatinine ratio 04/20/2021 23* 12 - 20   Final    GFR est AA 04/20/2021 >60  >60 ml/min/1.73m2 Final    GFR est non-AA 04/20/2021 >60  >60 ml/min/1.73m2 Final    Comment: Estimated GFR is calculated using the IDMS-traceable Modification of Diet in  Renal Disease (MDRD) Study equation, reported for both  Americans  (GFRAA) and non- Americans (GFRNA), and normalized to 1.73m2 body  surface area. The physician must decide which value applies to the patient.  Calcium 04/20/2021 9.1  8.5 - 10.1 MG/DL Final    Bilirubin, total 04/20/2021 0.3  0.2 - 1.0 MG/DL Final    ALT (SGPT) 04/20/2021 25  12 - 78 U/L Final    AST (SGOT) 04/20/2021 10* 15 - 37 U/L Final    Alk. phosphatase 04/20/2021 112  45 - 117 U/L Final    Protein, total 04/20/2021 6.7  6.4 - 8.2 g/dL Final    Albumin 04/20/2021 3.8  3.5 - 5.0 g/dL Final    Globulin 04/20/2021 2.9  2.0 - 4.0 g/dL Final    A-G Ratio 04/20/2021 1.3  1.1 - 2.2   Final     Review of Systems   HENT: Negative for congestion, sinus pain and sore throat. Eyes: Negative for photophobia and visual disturbance. Respiratory: Negative for chest tightness and shortness of breath. Cardiovascular: Negative for chest pain and palpitations. Musculoskeletal: Negative for back pain and neck pain. Neurological: Negative for dizziness, weakness and numbness.           Physical Exam    [INSTRUCTIONS:  \"[x]\" Indicates a positive item  \"[]\" Indicates a negative item  -- DELETE ALL ITEMS NOT EXAMINED]    Constitutional: [x] Appears well-developed and well-nourished [x] No apparent distress      [] Abnormal -     Mental status: [x] Alert and awake  [x] Oriented to person/place/time [x] Able to follow commands    [] Abnormal -     Eyes:   EOM    [x]  Normal    [] Abnormal -   Sclera  [x]  Normal    [] Abnormal -          Discharge [x]  None visible   [] Abnormal - HENT: [x] Normocephalic, atraumatic  [] Abnormal -   [x] Mouth/Throat: Mucous membranes are moist    External Ears [x] Normal  [] Abnormal -    Neck: [x] No visualized mass [] Abnormal -     Pulmonary/Chest: [x] Respiratory effort normal   [x] No visualized signs of difficulty breathing or respiratory distress        [] Abnormal -      Musculoskeletal:   [x] Normal gait with no signs of ataxia         [x] Normal range of motion of neck        [] Abnormal -     Neurological:        [x] No Facial Asymmetry (Cranial nerve 7 motor function) (limited exam due to video visit)          [x] No gaze palsy        [] Abnormal -          Skin:        [x] No significant exanthematous lesions or discoloration noted on facial skin         [] Abnormal -            Psychiatric:       [x] Normal Affect [] Abnormal -        [x] No Hallucinations    Other pertinent observable physical exam findings:-            Enocgregorio Rader, was evaluated through a synchronous (real-time) audio-video encounter. The patient (or guardian if applicable) is aware that this is a billable service. Verbal consent to proceed has been obtained within the past 12 months. The visit was conducted pursuant to the emergency declaration under the [address] [place] [address] authority and the [employer] and official[employer] General Act. Patient identification was verified, and a caregiver was present when appropriate. The patient was located in a state where the provider was credentialed to provide care. An electronic signature was used to authenticate this note.   -- Norma David MD

## 2022-03-10 ENCOUNTER — TELEPHONE (OUTPATIENT)
Dept: PRIMARY CARE CLINIC | Age: 37
End: 2022-03-10

## 2022-03-10 NOTE — TELEPHONE ENCOUNTER
Patient tested positive for COVID. Said she has a bad cough and having hard time sleeping. Wanted a virtual visit today. Told patient Dr. Krysta Reardon is not in the office on Thursdays but I can seen message to the nurse who can further assist her.

## 2022-03-10 NOTE — TELEPHONE ENCOUNTER
Returned call to patient. She was requesting something called in to the pharmacy for a cough. Explained that an appointment is needed however Dr Oneil Nichols is not in the office today and there aren't any openings with the providers that are here.    Suggested to patient to go to urgent care

## 2022-09-22 DIAGNOSIS — G43.111 INTRACTABLE MIGRAINE WITH AURA WITH STATUS MIGRAINOSUS: Primary | ICD-10-CM

## 2022-09-26 ENCOUNTER — TELEPHONE (OUTPATIENT)
Dept: PRIMARY CARE CLINIC | Age: 37
End: 2022-09-26

## 2022-09-26 NOTE — TELEPHONE ENCOUNTER
----- Message from [facility] sent at 9/26/2022 10:57 AM EDT -----  Subject: Medication Problem    Medication: ubrogepant (UBRELVY) 50 mg tablet  Dosage: take one by mouth as needed  Ordering Provider: Patricia Stanton MD     Question/Problem: per patient pharmacy needs a pre-auth to fill this med. per pharmacy it was faxed on Thursday. Please call to update the patient   asap. Additional Information for Provider:     Pharmacy: [facility]/PHARMACY [address]  AT   [address]     ---------------------------------------------------------------------------  --------------  Radha Fierro INFO  [phone]; OK to leave message on voicemail  ---------------------------------------------------------------------------  --------------    SCRIPT ANSWERS  Relationship to Patient: Self

## 2022-10-04 ENCOUNTER — OFFICE VISIT (OUTPATIENT)
Dept: PRIMARY CARE CLINIC | Age: 37
End: 2022-10-04
Payer: COMMERCIAL

## 2022-10-04 VITALS
BODY MASS INDEX: 26.33 KG/M2 | HEIGHT: 63 IN | HEART RATE: 83 BPM | RESPIRATION RATE: 16 BRPM | TEMPERATURE: 97.8 F | DIASTOLIC BLOOD PRESSURE: 79 MMHG | WEIGHT: 148.6 LBS | OXYGEN SATURATION: 95 % | SYSTOLIC BLOOD PRESSURE: 122 MMHG

## 2022-10-04 DIAGNOSIS — E03.9 ACQUIRED HYPOTHYROIDISM: ICD-10-CM

## 2022-10-04 DIAGNOSIS — Z80.3 FAMILY HISTORY OF BREAST CANCER: ICD-10-CM

## 2022-10-04 DIAGNOSIS — G43.019 INTRACTABLE MIGRAINE WITHOUT AURA AND WITHOUT STATUS MIGRAINOSUS: ICD-10-CM

## 2022-10-04 DIAGNOSIS — Z11.59 NEED FOR HEPATITIS C SCREENING TEST: ICD-10-CM

## 2022-10-04 DIAGNOSIS — E55.9 VITAMIN D DEFICIENCY: ICD-10-CM

## 2022-10-04 DIAGNOSIS — Z00.00 PHYSICAL EXAM: Primary | ICD-10-CM

## 2022-10-04 DIAGNOSIS — J45.40 MODERATE PERSISTENT ASTHMA WITHOUT COMPLICATION: ICD-10-CM

## 2022-10-04 PROCEDURE — 99395 PREV VISIT EST AGE 18-39: CPT | Performed by: INTERNAL MEDICINE

## 2022-10-04 PROCEDURE — 99214 OFFICE O/P EST MOD 30 MIN: CPT | Performed by: INTERNAL MEDICINE

## 2022-10-04 NOTE — PROGRESS NOTES
1. \"Have you been to the ER, urgent care clinic since your last visit? Hospitalized since your last visit? \" Yes When: Urgent Care     2. \"Have you seen or consulted any other health care providers outside of the [address] [place] [address] since your last visit? \" No     3. For patients aged 39-70: Has the patient had a colonoscopy / FIT/ Cologuard? NA - based on age      If the patient is female:    4. For patients aged 41-77: Has the patient had a mammogram within the past 2 years? NA - based on age or sex      11. For patients aged 21-65: Has the patient had a pap smear? Yes - no Care Gap present    .   Chief Complaint   Patient presents with    Physical    Migraine    Form Completion     FMLA for migraines

## 2022-10-04 NOTE — PROGRESS NOTES
Santa Medina (: 1985) is a 39 y.o. female, established patient, here for evaluation of the following chief complaint(s):  Physical, Migraine, and Form Completion (FMLA for migraines )       ASSESSMENT/PLAN:  Below is the assessment and plan developed based on review of pertinent history, physical exam, labs, studies, and medications. 1. Physical exam  Complete physical exam done.  -     TSH 3RD GENERATION; Future  -     METABOLIC PANEL, COMPREHENSIVE; Future  -     CBC W/O DIFF; Future  -     LIPID PANEL; Future  2. Need for hepatitis C screening test  -     HEPATITIS C AB; Future  3. Intractable migraine without aura and without status migrainosus  I told her if  [place] is not effective then she should switch to Nurtec and start taking it every other day for therapeutic and prophylaxis   -     rimegepant (NURTEC) 75 mg disintegrating tablet; Take 1 Tablet by mouth once as needed for Migraine for up to 1 dose., Print, Disp-16 Tablet, R-0  4. Vitamin D deficiency  -     VITAMIN D, 25 HYDROXY; Future  5. Moderate persistent asthma without complication  Told her if asthma is not controlled on 1 inhaler she can consider taking another 1 during the wintertime. 6. Acquired hypothyroidism  Followed by endocrinologist.  7. Family history of breast cancer  -     BRCASSURE BRCA1 AND BRCA2 COMPREHENSIVE ANALYSIS; Future    FMLA paper work done during an appointment. SUBJECTIVE/OBJECTIVE:    Patient seen today for physical exam but she also wants to discuss her recent migraine attacks and wants me to do an intermittent FMLA paperwork for her. Her migraine headaches have been getting worse , she had to take off from work couple of times in last month due to severe migraine attack where she started throwing up and was very dizzy. Her coworkers have to drive her home. She has been getting up in the morning with headaches using Excedrin with minimum relief. Imitrex is not helping anymore.   We did prescribe her Maxalt which did not work at all. Troy Chappell was approved by her insurance but she has not yet started taking it. She wants to know if she can take something for prophylaxis. She has been using her inhaler twice a day and she does think since her COVID asthma is gotten worse she has been using albuterol inhaler more often. She has been seeing an endocrinologist and last TSH was within the normal range. She has been seeing gynecologist for Pap smear and breast exam.  She would like to get BRCA testing as she has strong family history of breast cancer both for her mother and father side. Visit Vitals  /79 (BP 1 Location: Left upper arm, BP Patient Position: Sitting)   Pulse 83   Temp 97.8 °F (36.6 °C) (Temporal)   Resp 16   Ht 5' 3\" (1.6 m)   Wt 148 lb 9.6 oz (67.4 kg)   LMP 09/17/2022   SpO2 95%   BMI 26.32 kg/m²      Current Outpatient Medications   Medication Sig Dispense Refill    cholecalciferol, vitamin D3, (VITAMIN D3 PO) Take  by mouth. ferrous sulfate (IRON PO) Take  by mouth. rimegepant (NURTEC) 75 mg disintegrating tablet Take 1 Tablet by mouth once as needed for Migraine for up to 1 dose. 16 Tablet 0    ondansetron (ZOFRAN ODT) 8 mg disintegrating tablet Take 1 Tablet by mouth every twelve (12) hours as needed for Nausea or Vomiting for up to 10 doses. 10 Tablet 0    ADVAIR DISKUS 500-50 mcg/dose diskus inhaler       montelukast (SINGULAIR) 10 mg tablet Take 10 mg by mouth daily. levalbuterol tartrate (XOPENEX) 45 mcg/actuation inhaler Take  by inhalation. fluticasone-salmeterol (ADVAIR) 250-50 mcg/dose diskus inhaler Take 1 Puff by inhalation two (2) times a day. 1 Inhaler 1    cetirizine (ZYRTEC) 10 mg tablet take 1 Tab by mouth daily. 90 Tab 4    azelastine (ASTELIN) 137 mcg (0.1 %) nasal spray SPRAY ONE SPRAY IN EACH NOSTRIL TWICE DAILY FOR 10 DAYS.  USE IN EACH NOSTRIL AS DIRECTED (Patient not taking: Reported on 10/4/2022) 1 Bottle 0    sucralfate (CARAFATE) 100 mg/mL suspension Take 5 mL by mouth four (4) times daily. (Patient not taking: Reported on 10/4/2022) 414 mL 0    ciclopirox (PENLAC) 8 % solution Apply thin layer to toe nightly. (Patient not taking: Reported on 10/4/2022) 6.6 mL 1    UNITHROID 125 mcg tablet       JUNEL 1.5/30, 21, 1.5-30 mg-mcg tab  (Patient not taking: Reported on 10/4/2022)      UNITHROID 50 mcg tablet       PRENATE PIXIE 10 mg iron- 1 mg-200 mg cap  (Patient not taking: Reported on 10/4/2022)       Past Medical History:   Diagnosis Date    Asthma     Ovarian cyst      Past Surgical History:   Procedure Laterality Date    HX ORTHOPAEDIC      right knee    AR ABDOMEN SURGERY PROC UNLISTED      3 abdominal      Lab Results   Component Value Date/Time    WBC 5.8 01/26/2021 11:26 AM    HGB 12.1 01/26/2021 11:26 AM    HCT 38.9 01/26/2021 11:26 AM    PLATELET 011 41/09/8371 11:26 AM    MCV 85.9 01/26/2021 11:26 AM     Lab Results   Component Value Date/Time    ALT (SGPT) 25 04/20/2021 11:41 AM    Alk. phosphatase 112 04/20/2021 11:41 AM    Bilirubin, total 0.3 04/20/2021 11:41 AM    Albumin 3.8 04/20/2021 11:41 AM    Protein, total 6.7 04/20/2021 11:41 AM    PLATELET 252 18/43/7055 11:26 AM       Lab Results   Component Value Date/Time    GFR est non-AA >60 04/20/2021 11:41 AM    GFR est AA >60 04/20/2021 11:41 AM    Creatinine 0.73 04/20/2021 11:41 AM    BUN 17 04/20/2021 11:41 AM    Sodium 139 04/20/2021 11:41 AM    Potassium 4.0 04/20/2021 11:41 AM    Chloride 102 04/20/2021 11:41 AM    CO2 32 04/20/2021 11:41 AM     Lab Results   Component Value Date/Time    TSH 2.50 01/26/2021 11:26 AM      Lab Results   Component Value Date/Time    Glucose 78 04/20/2021 11:41 AM       Review of Systems   Constitutional:  Negative for activity change, fatigue and unexpected weight change. HENT:  Negative for congestion, hearing loss, rhinorrhea and sore throat. Eyes:  Negative for discharge. Respiratory:  Positive for wheezing.  Negative for cough, chest tightness and shortness of breath. Cardiovascular:  Negative for leg swelling. Gastrointestinal:  Negative for abdominal pain, constipation and diarrhea. Genitourinary:  Negative for dysuria, flank pain, frequency and urgency. Musculoskeletal:  Negative for arthralgias, back pain and myalgias. Skin:  Negative for color change and rash. Neurological:  Positive for dizziness and headaches. Negative for light-headedness. Psychiatric/Behavioral:  Negative for dysphoric mood and sleep disturbance. The patient is not nervous/anxious. Physical Exam    Vitals and nursing note reviewed. Constitutional:       Appearance: Normal appearance. Eyes:      Extraocular Movements: Extraocular movements intact. Pupils: Pupils are equal, round, and reactive to light. Cardiovascular:      Rate and Rhythm: Normal rate and regular rhythm. Pulmonary:      Effort: Pulmonary effort is normal.      Breath sounds: Normal breath sounds. Abdominal:      General: Bowel sounds are normal. There is no distension. Palpations: Abdomen is soft. Musculoskeletal:         General: No swelling. Normal range of motion. Cervical back: Normal range of motion and neck supple. Right lower leg: No edema. Left lower leg: No edema. Neurological:      General: No focal deficit present. Mental Status:  Alert and oriented to person, place, and time. Motor: No weakness. Psychiatric:         Mood and Affect: Mood normal.         Behavior: Behavior normal.        An electronic signature was used to authenticate this note.   -- Cielo Arceo MD

## 2022-10-21 DIAGNOSIS — G43.019 INTRACTABLE MIGRAINE WITHOUT AURA AND WITHOUT STATUS MIGRAINOSUS: Primary | ICD-10-CM

## 2022-10-21 RX ORDER — METHYLPREDNISOLONE 4 MG/1
TABLET ORAL
Qty: 1 DOSE PACK | Refills: 0 | Status: SHIPPED | OUTPATIENT
Start: 2022-10-21

## 2022-10-24 ENCOUNTER — NURSE TRIAGE (OUTPATIENT)
Dept: OTHER | Facility: CLINIC | Age: 37
End: 2022-10-24

## 2022-10-24 ENCOUNTER — TELEPHONE (OUTPATIENT)
Dept: PRIMARY CARE CLINIC | Age: 37
End: 2022-10-24

## 2022-10-24 NOTE — TELEPHONE ENCOUNTER
Location of patient: [address] [place] from [place] at [facility] with [employer]. Subjective: Caller states \"I have a stiff neck \"     Current Symptoms: Saw PCP a couple weeks ago for headaches. Woke up by a migraine 5 days ago and went to ER. Migraine is gone but still has a lingering headache. Neck is painful and stiff and unable to touch her chin to her chest to due to pain. No known neck injury. Unable to turn neck all the way to the left. Onset: 5 days ago; worsening    Associated Symptoms: reduced activity    Pain Severity: 10/10 at its worst and 5-6/10 at time of call; aching, sharp, and radiating into her shoulder; constant, moderate, severe    Temperature: 97.1 axillary    What has been tried: Heat and steroids prescribed by PCP. Tylenol and stretching. LMP: NA Pregnant: No    Recommended disposition: Go to ED/UCC Now (Or to Office with PCP Approval)    Care advice provided, patient verbalizes understanding; denies any other questions or concerns; instructed to call back for any new or worsening symptoms. Writer provided warm transfer to Tamra Mccain at [employer] for second level triage. Attention Provider: Thank you for allowing me to participate in the care of your patient. The patient was connected to triage in response to information provided to the [facility]. Please do not respond through this encounter as the response is not directed to a shared pool.     Reason for Disposition   Stiff neck (can't touch chin to chest) and has headache    Protocols used: Neck Pain or Stiffness-ADULT-[place]

## 2022-10-24 NOTE — TELEPHONE ENCOUNTER
Patient called and was transferred back. Patient is on day three of steroid dose pack, some headache relief but patient reports having severely limited neck mobility, cannot move side to side or chin to chest. And now reports having jaw pain and shoulder pain.  I spoke with Dr Angelo Baca and per MD verbal order she has stated for the patient to go to the ED

## 2023-02-18 ENCOUNTER — PATIENT MESSAGE (OUTPATIENT)
Dept: PRIMARY CARE CLINIC | Age: 38
End: 2023-02-18

## 2023-02-18 DIAGNOSIS — R11.0 NAUSEA: ICD-10-CM

## 2023-02-18 DIAGNOSIS — G43.111 INTRACTABLE MIGRAINE WITH AURA WITH STATUS MIGRAINOSUS: ICD-10-CM

## 2023-02-20 RX ORDER — ONDANSETRON 8 MG/1
8 TABLET, ORALLY DISINTEGRATING ORAL
Qty: 10 TABLET | Refills: 0 | Status: SHIPPED | OUTPATIENT
Start: 2023-02-20

## 2023-02-20 NOTE — TELEPHONE ENCOUNTER
Requested Prescriptions     Pending Prescriptions Disp Refills    ondansetron (ZOFRAN ODT) 8 mg disintegrating tablet 10 Tablet 0     Sig: Take 1 Tablet by mouth every twelve (12) hours as needed for Nausea or Vomiting for up to 10 doses.         Last Visit 104/22  Last Refill 12/28/21

## 2023-06-01 DIAGNOSIS — G43.111 MIGRAINE WITH AURA, INTRACTABLE, WITH STATUS MIGRAINOSUS: ICD-10-CM

## 2023-06-01 DIAGNOSIS — R11.0 NAUSEA: ICD-10-CM

## 2023-06-01 RX ORDER — ONDANSETRON 8 MG/1
TABLET, ORALLY DISINTEGRATING ORAL
Qty: 10 TABLET | Refills: 0 | Status: SHIPPED | OUTPATIENT
Start: 2023-06-01

## 2023-06-01 RX ORDER — UBROGEPANT 50 MG/1
TABLET ORAL
Qty: 10 TABLET | Refills: 1 | Status: SHIPPED | OUTPATIENT
Start: 2023-06-01

## 2023-09-06 ENCOUNTER — TELEPHONE (OUTPATIENT)
Dept: PRIMARY CARE CLINIC | Facility: CLINIC | Age: 38
End: 2023-09-06

## 2023-10-27 DIAGNOSIS — G43.111 MIGRAINE WITH AURA, INTRACTABLE, WITH STATUS MIGRAINOSUS: ICD-10-CM

## 2023-10-27 RX ORDER — UBROGEPANT 50 MG/1
50 TABLET ORAL ONCE AS NEEDED
Qty: 10 TABLET | Refills: 1 | Status: SHIPPED | OUTPATIENT
Start: 2023-10-27

## 2024-07-12 DIAGNOSIS — G43.111 MIGRAINE WITH AURA, INTRACTABLE, WITH STATUS MIGRAINOSUS: ICD-10-CM

## 2024-07-12 RX ORDER — UBROGEPANT 50 MG/1
50 TABLET ORAL ONCE AS NEEDED
Qty: 10 TABLET | Refills: 0 | Status: SHIPPED | OUTPATIENT
Start: 2024-07-12

## 2024-10-18 ENCOUNTER — OFFICE VISIT (OUTPATIENT)
Dept: PRIMARY CARE CLINIC | Facility: CLINIC | Age: 39
End: 2024-10-18
Payer: COMMERCIAL

## 2024-10-18 VITALS
HEIGHT: 63 IN | HEART RATE: 90 BPM | WEIGHT: 166 LBS | TEMPERATURE: 97.4 F | DIASTOLIC BLOOD PRESSURE: 86 MMHG | BODY MASS INDEX: 29.41 KG/M2 | OXYGEN SATURATION: 97 % | SYSTOLIC BLOOD PRESSURE: 128 MMHG | RESPIRATION RATE: 20 BRPM

## 2024-10-18 DIAGNOSIS — G89.29 CHRONIC MIDLINE LOW BACK PAIN WITHOUT SCIATICA: ICD-10-CM

## 2024-10-18 DIAGNOSIS — G43.009 MIGRAINE WITHOUT AURA AND WITHOUT STATUS MIGRAINOSUS, NOT INTRACTABLE: ICD-10-CM

## 2024-10-18 DIAGNOSIS — T81.41XA SUPERFICIAL INCISIONAL SURGICAL SITE INFECTION: Primary | ICD-10-CM

## 2024-10-18 DIAGNOSIS — M54.50 CHRONIC MIDLINE LOW BACK PAIN WITHOUT SCIATICA: ICD-10-CM

## 2024-10-18 DIAGNOSIS — Z98.890 HISTORY OF LUMBAR LAMINECTOMY FOR SPINAL CORD DECOMPRESSION: ICD-10-CM

## 2024-10-18 DIAGNOSIS — J45.40 MODERATE PERSISTENT ASTHMA WITHOUT COMPLICATION: ICD-10-CM

## 2024-10-18 PROCEDURE — 99214 OFFICE O/P EST MOD 30 MIN: CPT | Performed by: INTERNAL MEDICINE

## 2024-10-18 RX ORDER — CEPHALEXIN 500 MG/1
500 CAPSULE ORAL 3 TIMES DAILY
Qty: 15 CAPSULE | Refills: 0 | Status: SHIPPED | OUTPATIENT
Start: 2024-10-18 | End: 2024-10-23

## 2024-10-18 RX ORDER — OXYCODONE HYDROCHLORIDE 5 MG/1
TABLET ORAL
COMMUNITY
Start: 2024-09-30

## 2024-10-18 SDOH — ECONOMIC STABILITY: FOOD INSECURITY: WITHIN THE PAST 12 MONTHS, THE FOOD YOU BOUGHT JUST DIDN'T LAST AND YOU DIDN'T HAVE MONEY TO GET MORE.: NEVER TRUE

## 2024-10-18 SDOH — ECONOMIC STABILITY: FOOD INSECURITY: WITHIN THE PAST 12 MONTHS, YOU WORRIED THAT YOUR FOOD WOULD RUN OUT BEFORE YOU GOT MONEY TO BUY MORE.: NEVER TRUE

## 2024-10-18 SDOH — ECONOMIC STABILITY: INCOME INSECURITY: HOW HARD IS IT FOR YOU TO PAY FOR THE VERY BASICS LIKE FOOD, HOUSING, MEDICAL CARE, AND HEATING?: NOT HARD AT ALL

## 2024-10-18 ASSESSMENT — ENCOUNTER SYMPTOMS
SORE THROAT: 0
DIARRHEA: 0
COUGH: 0
COLOR CHANGE: 0
SHORTNESS OF BREATH: 0
CONSTIPATION: 0
BACK PAIN: 1
ABDOMINAL PAIN: 0
EYE DISCHARGE: 0
RHINORRHEA: 0
CHEST TIGHTNESS: 0

## 2024-10-18 ASSESSMENT — PATIENT HEALTH QUESTIONNAIRE - PHQ9
SUM OF ALL RESPONSES TO PHQ QUESTIONS 1-9: 0
SUM OF ALL RESPONSES TO PHQ QUESTIONS 1-9: 0
SUM OF ALL RESPONSES TO PHQ9 QUESTIONS 1 & 2: 0
SUM OF ALL RESPONSES TO PHQ QUESTIONS 1-9: 0
SUM OF ALL RESPONSES TO PHQ QUESTIONS 1-9: 0
1. LITTLE INTEREST OR PLEASURE IN DOING THINGS: NOT AT ALL
2. FEELING DOWN, DEPRESSED OR HOPELESS: NOT AT ALL

## 2024-10-18 NOTE — PROGRESS NOTES
\"Have you been to the ER, urgent care clinic since your last visit?  Hospitalized since your last visit?\"    Hospital       “Have you seen or consulted any other health care providers outside our system since your last visit?”    [facility]        “Have you had a pap smear?”    [address]  Record has been requested via fax.     Chief Complaint   Patient presents with    Follow Up After Procedure     Back surgery on 9/26 - herniated disk   Wound check.        Pt is ok with scribe.   
     Pharynx: Oropharynx is clear.   Eyes:      General: No scleral icterus.        Right eye: No discharge.         Left eye: No discharge.      Extraocular Movements: Extraocular movements intact.      Conjunctiva/sclera: Conjunctivae normal.   Cardiovascular:      Rate and Rhythm: Normal rate and regular rhythm.      Pulses: Normal pulses.   Pulmonary:      Effort: Pulmonary effort is normal.      Breath sounds: Normal breath sounds. No wheezing.   Abdominal:      General: Bowel sounds are normal. There is no distension.      Palpations: Abdomen is soft.      Tenderness: There is no abdominal tenderness.   Musculoskeletal:      Cervical back: Normal range of motion and neck supple.      Right lower leg: No edema.      Left lower leg: No edema.   Lymphadenopathy:      Cervical: No cervical adenopathy.   Skin:     Comments: Incision site slightly red, no tenderness   Neurological:      Mental Status: She is alert and oriented to person, place, and time.   Psychiatric:         Mood and Affect: Mood normal.            Alessia DAVIDSON, am scribing for and in the presence of Jerica Krishnamurthy MD. 10/18/24/11:23 AM EDT  Jerica DAVIDSON MD, personally performed the services described by my scribe in my presence, and it is both accurate and complete.    An electronic signature was used to authenticate this note.    --Alessia Rob

## 2025-02-14 DIAGNOSIS — G43.111 MIGRAINE WITH AURA, INTRACTABLE, WITH STATUS MIGRAINOSUS: ICD-10-CM

## 2025-02-15 RX ORDER — UBROGEPANT 50 MG/1
50 TABLET ORAL ONCE AS NEEDED
Qty: 10 TABLET | Refills: 0 | Status: SHIPPED | OUTPATIENT
Start: 2025-02-15

## 2025-04-21 DIAGNOSIS — G43.111 MIGRAINE WITH AURA, INTRACTABLE, WITH STATUS MIGRAINOSUS: ICD-10-CM

## 2025-04-21 RX ORDER — UBROGEPANT 50 MG/1
50 TABLET ORAL ONCE AS NEEDED
Qty: 10 TABLET | Refills: 3 | Status: SHIPPED | OUTPATIENT
Start: 2025-04-21

## 2025-08-18 ENCOUNTER — OFFICE VISIT (OUTPATIENT)
Dept: PRIMARY CARE CLINIC | Facility: CLINIC | Age: 40
End: 2025-08-18
Payer: COMMERCIAL

## 2025-08-18 VITALS
HEART RATE: 86 BPM | HEIGHT: 63 IN | WEIGHT: 169.8 LBS | DIASTOLIC BLOOD PRESSURE: 96 MMHG | BODY MASS INDEX: 30.09 KG/M2 | OXYGEN SATURATION: 99 % | SYSTOLIC BLOOD PRESSURE: 160 MMHG

## 2025-08-18 DIAGNOSIS — E03.9 ACQUIRED HYPOTHYROIDISM: ICD-10-CM

## 2025-08-18 DIAGNOSIS — Z11.4 ENCOUNTER FOR SCREENING FOR HIV: ICD-10-CM

## 2025-08-18 DIAGNOSIS — Z78.9 HEPATITIS B VACCINATION STATUS UNKNOWN: ICD-10-CM

## 2025-08-18 DIAGNOSIS — J45.40 MODERATE PERSISTENT ASTHMA WITHOUT COMPLICATION: ICD-10-CM

## 2025-08-18 DIAGNOSIS — Z00.00 PHYSICAL EXAM: ICD-10-CM

## 2025-08-18 DIAGNOSIS — G43.019 INTRACTABLE MIGRAINE WITHOUT AURA AND WITHOUT STATUS MIGRAINOSUS: ICD-10-CM

## 2025-08-18 DIAGNOSIS — I10 PRIMARY HYPERTENSION: ICD-10-CM

## 2025-08-18 DIAGNOSIS — Z98.890 S/P LAMINECTOMY: ICD-10-CM

## 2025-08-18 DIAGNOSIS — K64.4 EXTERNAL HEMORRHOIDS: ICD-10-CM

## 2025-08-18 DIAGNOSIS — Z00.00 PHYSICAL EXAM: Primary | ICD-10-CM

## 2025-08-18 PROCEDURE — 99214 OFFICE O/P EST MOD 30 MIN: CPT | Performed by: INTERNAL MEDICINE

## 2025-08-18 PROCEDURE — 99395 PREV VISIT EST AGE 18-39: CPT | Performed by: INTERNAL MEDICINE

## 2025-08-18 PROCEDURE — 3080F DIAST BP >= 90 MM HG: CPT | Performed by: INTERNAL MEDICINE

## 2025-08-18 PROCEDURE — 3077F SYST BP >= 140 MM HG: CPT | Performed by: INTERNAL MEDICINE

## 2025-08-18 RX ORDER — ALBUTEROL SULFATE AND BUDESONIDE 90; 80 UG/1; UG/1
AEROSOL, METERED RESPIRATORY (INHALATION)
COMMUNITY
Start: 2025-07-19

## 2025-08-18 RX ORDER — PROPRANOLOL HYDROCHLORIDE 10 MG/1
10 TABLET ORAL 2 TIMES DAILY
Qty: 60 TABLET | Refills: 0 | Status: SHIPPED | OUTPATIENT
Start: 2025-08-18

## 2025-08-18 RX ORDER — HYDROCORTISONE ACETATE 25 MG/1
25 SUPPOSITORY RECTAL EVERY 12 HOURS
Qty: 10 SUPPOSITORY | Refills: 0 | Status: SHIPPED | OUTPATIENT
Start: 2025-08-18

## 2025-08-18 SDOH — ECONOMIC STABILITY: FOOD INSECURITY: WITHIN THE PAST 12 MONTHS, YOU WORRIED THAT YOUR FOOD WOULD RUN OUT BEFORE YOU GOT MONEY TO BUY MORE.: NEVER TRUE

## 2025-08-18 SDOH — ECONOMIC STABILITY: FOOD INSECURITY: WITHIN THE PAST 12 MONTHS, THE FOOD YOU BOUGHT JUST DIDN'T LAST AND YOU DIDN'T HAVE MONEY TO GET MORE.: NEVER TRUE

## 2025-08-18 ASSESSMENT — PATIENT HEALTH QUESTIONNAIRE - PHQ9
SUM OF ALL RESPONSES TO PHQ QUESTIONS 1-9: 0
2. FEELING DOWN, DEPRESSED OR HOPELESS: NOT AT ALL
SUM OF ALL RESPONSES TO PHQ QUESTIONS 1-9: 0
1. LITTLE INTEREST OR PLEASURE IN DOING THINGS: NOT AT ALL

## 2025-08-18 ASSESSMENT — ENCOUNTER SYMPTOMS
CONSTIPATION: 0
CHEST TIGHTNESS: 0
BACK PAIN: 0
RHINORRHEA: 0
EYE DISCHARGE: 0
ABDOMINAL PAIN: 0
COUGH: 0
SORE THROAT: 0
SHORTNESS OF BREATH: 0
DIARRHEA: 0
COLOR CHANGE: 0